# Patient Record
(demographics unavailable — no encounter records)

---

## 2024-11-04 NOTE — HISTORY OF PRESENT ILLNESS
[FreeTextEntry1] : wellness exam [de-identified] : MS. CALL IS A PLEASANT 66 YO PRESENTING FOR YEARLY WELLNESS EXAM.    CARDIOLOGY: DR. TYLER OPHTHALMOLOGY: [facility] EYE CARE GYN: DR. CARMONA DERMATOLOGY: DR. TORIBIO GI: DR. MELGOZA

## 2024-11-04 NOTE — HEALTH RISK ASSESSMENT
[Excellent] : ~his/her~  mood as  excellent [No] : In the past 12 months have you used drugs other than those required for medical reasons? No [No falls in past year] : Patient reported no falls in the past year [Little interest or pleasure doing things] : 1) Little interest or pleasure doing things [Feeling down, depressed, or hopeless] : 2) Feeling down, depressed, or hopeless [0] : 2) Feeling down, depressed, or hopeless: Not at all (0) [PHQ-2 Negative - No further assessment needed] : PHQ-2 Negative - No further assessment needed [Never] : Never [NO] : No [HIV test declined] : HIV test declined [Hepatitis C test declined] : Hepatitis C test declined [None] : None [Alone] : lives alone [Retired] : retired [High School] : high school [] :  [# Of Children ___] : has [unfilled] children [Feels Safe at Home] : Feels safe at home [Fully functional (bathing, dressing, toileting, transferring, walking, feeding)] : Fully functional (bathing, dressing, toileting, transferring, walking, feeding) [Fully functional (using the telephone, shopping, preparing meals, housekeeping, doing laundry, using] : Fully functional and needs no help or supervision to perform IADLs (using the telephone, shopping, preparing meals, housekeeping, doing laundry, using transportation, managing medications and managing finances) [Reports normal functional visual acuity (ie: able to read med bottle)] : Reports normal functional visual acuity [Smoke Detector] : smoke detector [Carbon Monoxide Detector] : carbon monoxide detector [Safety elements used in home] : safety elements used in home [Seat Belt] :  uses seat belt [Sunscreen] : uses sunscreen [With Patient/Caregiver] : , with patient/caregiver [de-identified] : SUTURE TO FINGER AFTER CUT FINGER 7/24 [de-identified] : STRETCHING, YOGA, WALKING [de-identified] : OVERALL GOOD [[id number]] : 0 [Change in mental status noted] : No change in mental status noted [Language] : denies difficulty with language [Learning/Retaining New Information] : denies difficulty learning/retaining new information [Handling Complex Tasks] : denies difficulty handling complex tasks [Sexually Active] : not sexually active [High Risk Behavior] : no high risk behavior [Reports changes in hearing] : Reports no changes in hearing [Reports changes in vision] : Reports no changes in vision [Reports changes in dental health] : Reports no changes in dental health [Travel to Developing Areas] : does not  travel to developing areas [TB Exposure] : is not being exposed to tuberculosis [Caregiver Concerns] : does not have caregiver concerns [MammogramDate] : 06/24 [MammogramComments] : [facility] [PapSmearDate] : 06/24 [BoneDensityDate] : 03/24 [ColonoscopyDate] : 09/22 [de-identified] : GLASSES FOR DISTANCE AND READING [de-identified] : APPOINTMENT WITH DENTIST NEXT WEEK [AdvancecareDate] : 11/24

## 2024-11-04 NOTE — PHYSICAL EXAM
[No Acute Distress] : no acute distress [Well Nourished] : well nourished [Well-Appearing] : well-appearing [Normal Sclera/Conjunctiva] : normal sclera/conjunctiva [PERRL] : pupils equal round and reactive to light [Normal Outer Ear/Nose] : the outer ears and nose were normal in appearance [Normal Oropharynx] : the oropharynx was normal [Normal TMs] : both tympanic membranes were normal [Supple] : supple [No Respiratory Distress] : no respiratory distress  [No Accessory Muscle Use] : no accessory muscle use [Clear to Auscultation] : lungs were clear to auscultation bilaterally [Normal Rate] : normal rate  [Regular Rhythm] : with a regular rhythm [Normal S1, S2] : normal S1 and S2 [Soft] : abdomen soft [Non Tender] : non-tender [Normal Bowel Sounds] : normal bowel sounds [No Joint Swelling] : no joint swelling [Grossly Normal Strength/Tone] : grossly normal strength/tone [Coordination Grossly Intact] : coordination grossly intact [No Focal Deficits] : no focal deficits [Normal Gait] : normal gait [Deep Tendon Reflexes (DTR)] : deep tendon reflexes were 2+ and symmetric [Speech Grossly Normal] : speech grossly normal [Normal Affect] : the affect was normal [Normal Mood] : the mood was normal

## 2024-11-04 NOTE — PLAN
[FreeTextEntry1] : VISION SCREENING SAFETY / HEALTHY HABITS REVIEWED HEALTHY DIET AND LIFESTYLE MODIFICATIONS VACCINATIONS DISCUSSED: COVID, RSV CHECK ROUTINE LAB WORK FOLLOW-UP ALL SPECIALISTS AS DIRECTED NEED MAMMOGRAM RESULTS FROM [facility] CALL WITH ANY QUESTIONS, CONCERNS OR CHANGES

## 2024-12-23 NOTE — ASSESSMENT
[FreeTextEntry1] : ASSESSMENT: The patient comes in today with chronic exacerbated left wrist and thumb discomfort.  She describes difficulty with activities that involve pinch and .  Symptoms are bothersome and are affecting her ADLs.  Symptoms today are consistent with left basal joint arthropathy as well as left de Quervain's tenosynovitis.  Treatment modalities were discussed, the patient elects for injections.  We have also discussed activity modifications and bracing for her condition.   The patient was adequately and thoroughly informed of my assessment of their current condition(s).  - This may diminish bodily function for the extremity.  We discussed prognosis, treatment modalities including operative and nonoperative options for the above diagnostic assessment. As always, 2nd opinion is always provided as an option. For this, when accessible, I was able to review other physicians note(s) including reviewing other tests, imaging results as well as personally view these results for my own interpretation.      Injection:   The risks and benefits of a steroid injection were discussed in detail. The risks include but are not limited to: pain, infection, swelling, flare response, bleeding, subcutaneous fat atrophy, skin depigmentation and/or elevation of blood sugar. The risk of incomplete resolution of symptoms, recurrence and additional intervention was reviewed and considered by the patient.  The patient agreed to proceed and under a sterile prep, I injected 1 unit (6mg) into 1 cc of a combination of Celestone and Lidocaine into the [left basal joint, left de Quervain's]. The patient tolerated the injection well.   The patient was adequately and thoroughly informed of my assessment of their current condition(s).   DISCUSSION: 1.  Injections as above.  Activity modifications.  Bracing provided. 2.  She recently returned from [place] visiting her daughters job.  She is heading to [place] this week for the holidays. 3. [x]

## 2024-12-23 NOTE — HISTORY OF PRESENT ILLNESS
[FreeTextEntry1] : Jayde is a very pleasant 67-year-old female who presents today with chronic exacerbated left wrist and thumb discomfort.  She describes difficulty with activities that involve pinch and .  Symptoms are bothersome and are affecting her ADLs.

## 2024-12-23 NOTE — PHYSICAL EXAM
[de-identified] : Examination at the level of the [left] wrist reveals tenderness at the level of the first dorsal compartment with a positive finkelstein.  Examination of the [left] thumb basal joint reveals pain with compression of the CMC joint with a positive grind test for pain. Adjacent thumb MCP joint is stable without hyperextension [de-identified] : [4] views of [bilateral hands and wrists] were obtained today in my office and were seen by me and discussed with the patient.  These [show findings consistent with bilateral basal joint OA and findings of IP joint OA]

## 2025-01-03 NOTE — HISTORY OF PRESENT ILLNESS
[No Pertinent Pulmonary History] : no history of asthma, COPD, sleep apnea, or smoking [No Adverse Anesthesia Reaction] : no adverse anesthesia reaction in self or family member [(Patient denies any chest pain, claudication, dyspnea on exertion, orthopnea, palpitations or syncope)] : Patient denies any chest pain, claudication, dyspnea on exertion, orthopnea, palpitations or syncope [Aortic Stenosis] : no aortic stenosis [Atrial Fibrillation] : no atrial fibrillation [Coronary Artery Disease] : no coronary artery disease [Recent Myocardial Infarction] : no recent myocardial infarction [Implantable Device/Pacemaker] : no implantable device/pacemaker [Chronic Anticoagulation] : no chronic anticoagulation [Chronic Kidney Disease] : no chronic kidney disease [Diabetes] : no diabetes [FreeTextEntry1] : UPPER EYELID BLEPHAROPLASTY UPPER EYELIDS [FreeTextEntry2] : JANUARY 8, 2025 [FreeTextEntry3] : DR. RUANO [FreeTextEntry4] : MS. CALL IS A PLEASANT 66 YO PRESENTING FOR MEDICAL CLEARANCE FOR UPCOMING EYELID SURGERY.  FEELING WELL TODAY.  NO HISTORY OF MI, STROKE OR SEIZURE.  NO PERSONAL OR FAMILY HISTORY OF BLOOD OR CLOTTING DISORDERS.  DENIES EASY BLEEDING OR BRUISING.  IS ABLE TO WALK MULTIPLE BLOCKS AND GO UP MULTIPLE FLIGHTS OF STAIRS WITHOUT DIFFICULTY.   YOGA AND WALKING, HIKING.  EXERCISES DAILY.  HAS HAD NO HEADACHE, DIZZINESS, CHEST PAIN, SHORTNESS OF BREATH, GI OR URINARY COMPLAINTS.  NO OTHER COMPLAINTS TODAY. CHRONIC HISTORY OF HYPERLIPIDEMIA AND HYPOTHYROIDISM.  ON REPATHA DUE TO INABILITY TO TOLERATE PO STATIN THERAPY.  ALSO HISTORY OF LOW VITAMIN D AND B12.  FEELING WELL TODAY. [FreeTextEntry7] : CARDIOLOGY DR. TYLER JANUARY 2024 ECHOCARDIOGRAM 1/24 HOLTER 9/20 STRESS TEST 9/20 ECHOCARDIOGRAM 9/20 [facility] MRI 2020

## 2025-01-03 NOTE — HISTORY OF PRESENT ILLNESS
[No Pertinent Pulmonary History] : no history of asthma, COPD, sleep apnea, or smoking [No Adverse Anesthesia Reaction] : no adverse anesthesia reaction in self or family member [(Patient denies any chest pain, claudication, dyspnea on exertion, orthopnea, palpitations or syncope)] : Patient denies any chest pain, claudication, dyspnea on exertion, orthopnea, palpitations or syncope [Aortic Stenosis] : no aortic stenosis [Atrial Fibrillation] : no atrial fibrillation [Coronary Artery Disease] : no coronary artery disease [Recent Myocardial Infarction] : no recent myocardial infarction [Implantable Device/Pacemaker] : no implantable device/pacemaker [Chronic Anticoagulation] : no chronic anticoagulation [Chronic Kidney Disease] : no chronic kidney disease [Diabetes] : no diabetes [FreeTextEntry1] : UPPER EYELID BLEPHAROPLASTY UPPER EYELIDS [FreeTextEntry2] : JANUARY 8, 2025 [FreeTextEntry3] : DR. RUANO [FreeTextEntry4] : MS. CALL IS A PLEASANT 68 YO PRESENTING FOR MEDICAL CLEARANCE FOR UPCOMING EYELID SURGERY.  FEELING WELL TODAY.  NO HISTORY OF MI, STROKE OR SEIZURE.  NO PERSONAL OR FAMILY HISTORY OF BLOOD OR CLOTTING DISORDERS.  DENIES EASY BLEEDING OR BRUISING.  IS ABLE TO WALK MULTIPLE BLOCKS AND GO UP MULTIPLE FLIGHTS OF STAIRS WITHOUT DIFFICULTY.   YOGA AND WALKING, HIKING.  EXERCISES DAILY.  HAS HAD NO HEADACHE, DIZZINESS, CHEST PAIN, SHORTNESS OF BREATH, GI OR URINARY COMPLAINTS.  NO OTHER COMPLAINTS TODAY. CHRONIC HISTORY OF HYPERLIPIDEMIA AND HYPOTHYROIDISM.  ON REPATHA DUE TO INABILITY TO TOLERATE PO STATIN THERAPY.  ALSO HISTORY OF LOW VITAMIN D AND B12.  FEELING WELL TODAY. [FreeTextEntry7] : CARDIOLOGY DR. TYLER JANUARY 2024 ECHOCARDIOGRAM 1/24 HOLTER 9/20 STRESS TEST 9/20 ECHOCARDIOGRAM 9/20 [facility] MRI 2020

## 2025-01-03 NOTE — PLAN
[FreeTextEntry1] : OPTIMIZED MEDICALLY FOR PROPOSED SURGICAL PROCEDURE PENDING PRE-OPERATIVE TESTING REVIEW EKG: NSR AT 73 BPM, NONSPECIFIC T CHRONIC COMPARED TO PRIOR EKG CARDIOLOGY CONSULTANT NOTE FROM 1/11/24 REVIEWED GOOD EXERCISE CAPACITY GI/DVT PROPHYLAXIS PER SURGERY AVOIDANCE OF NSAIDS, VITAMINS AND ASPIRIN CONTAINING PRODUCTS PRIOR TO SURGERY CALL WITH ANY QUESTIONS, CONCERNS OR CHANGES PRIOR TO PROCEDURE SUPPORT PROVIDED FOLLOW-UP POST-OPERATIVELY  ADDITIONAL TIME SPENT IN CHART REVIEW, NOTE COMPLTION AND COUNSELING  addendum 1/3/25: pre-operative lab work reviewed; optimized medically for proposed low risk procedure

## 2025-01-30 NOTE — PHYSICAL EXAM
[General Appearance - Well Developed] : well developed [General Appearance - Well Nourished] : well nourished [Normal Conjunctiva] : the conjunctiva exhibited no abnormalities [Normal Oropharynx] : normal oropharynx [Normal Jugular Venous V Waves Present] : normal jugular venous V waves present [] : no respiratory distress [Respiration, Rhythm And Depth] : normal respiratory rhythm and effort [Auscultation Breath Sounds / Voice Sounds] : lungs were clear to auscultation bilaterally [Heart Rate And Rhythm] : heart rate and rhythm were normal [Heart Sounds] : normal S1 and S2 [Murmurs] : no murmurs present [Edema] : no peripheral edema present [Abdomen Soft] : soft [Abdomen Tenderness] : non-tender [Abnormal Walk] : normal gait [Nail Clubbing] : no clubbing of the fingernails [Cyanosis, Localized] : no localized cyanosis [Skin Color & Pigmentation] : normal skin color and pigmentation [Oriented To Time, Place, And Person] : oriented to person, place, and time [No Anxiety] : not feeling anxious

## 2025-01-30 NOTE — PHYSICAL EXAM
[General Appearance - Well Developed] : well developed [General Appearance - Well Nourished] : well nourished [Normal Conjunctiva] : the conjunctiva exhibited no abnormalities [Normal Oropharynx] : normal oropharynx [Normal Jugular Venous V Waves Present] : normal jugular venous V waves present [] : no respiratory distress [Respiration, Rhythm And Depth] : normal respiratory rhythm and effort [Heart Rate And Rhythm] : heart rate and rhythm were normal [Auscultation Breath Sounds / Voice Sounds] : lungs were clear to auscultation bilaterally [Heart Sounds] : normal S1 and S2 [Murmurs] : no murmurs present [Edema] : no peripheral edema present [Abdomen Soft] : soft [Abdomen Tenderness] : non-tender [Abnormal Walk] : normal gait [Nail Clubbing] : no clubbing of the fingernails [Cyanosis, Localized] : no localized cyanosis [Skin Color & Pigmentation] : normal skin color and pigmentation [No Anxiety] : not feeling anxious [Oriented To Time, Place, And Person] : oriented to person, place, and time

## 2025-02-03 NOTE — DISCUSSION/SUMMARY
[EKG obtained to assist in diagnosis and management of assessed problem(s)] : EKG obtained to assist in diagnosis and management of assessed problem(s) [FreeTextEntry1] : Patient is a 68yo F with HLD, family h/o premature CAD here for cardiac follow up.   HLD -Cannot tolerate statins, had been on Livalo but not covered by insurance. (tried zetia, zocor, lipitor, crestor and has not tolerated any).  -HAd been on Praluent, changed to Repatha due to insurance 2021. Lipids mildly improved 11/2024 but increased over past year or so -Eats well, she is pescatarian. Could cut back ice cream. Will plan CAC to help risk stratify and can consider adding zetia or Nexletol   MR: -Echo 9/2020 unremarkable, mild MR/TR -Repeat echo 1/2024 with mild-mod MR, surveillance only still     PVCs/NSVT: -PVCs and short run NSVT in past with structurally normal heart, Cardiac MRI unremarkable in 2020 -Still with frequent ectopy on ECG without symptoms, echo with normal LV function in 2024 -Stress test 9/2020 without ischemia.   1. CV stable at this time 2. Recommend aggressive diet and lifestyle modifications, continues to exercise regularly  3. Regular PMD follow up 4. Check CAC score, call with results and determine if will add additional lipid lowering agent 5. Follow up 6months

## 2025-02-03 NOTE — HISTORY OF PRESENT ILLNESS
[FreeTextEntry1] : Patient is a 66yo F with HLD, family h/o CAD here for cardiac follow up.  Patient has been doing well without any chest pain or shortness of breath. Patient denies PND/orthopnea/edema/palpitations/syncope/claudication. Feels good doing walking and yard work/garden work. Does Yoga frequently still and feels well, a bit less active lately but looking to start again.      Lives with daughter who is in grad school. She is . Not working, helped raise kids and grandkids. Travels  frequently as well  Father passed in 2024  ROS: GI and  negative

## 2025-02-03 NOTE — ASSESSMENT
[FreeTextEntry1] : ECG: SR, frequent unifocal PVCs, NSST    HDL 92  TG 98 (11/2024)   HDL 80  TG 83 (5/2024)   HDL 94  TG 85 (8/2023)   HDL 85   (6/2022)  HDL 81    HDL 96  TG 92 (4/2021)   CARDIAC MRI 2020 1. Normal study 2. EF 62%  ECHO 1/2024: 1. Normal LV size, systolic and diastolic function. EF 60-65% 2. Mild to moderate MR   ECHO 9/2020: 1. Normal LV size, systolic and diastolic function. EF 55-60% 2. Normal RV/LA/RA 3. Mild MR/TR  EX NUCLEAR STRESS TEST 9/2020: 1. Negative for ischemia/infarct 2. EF 55% 3. Frequent PVCs and 10 beats NSVT, ventricular bigeminy and couplets  2 Week MCOT 2020: SR, rare ectopy   HOLTER 9/2020: 1. SR 2. 21 multifocal PVCs  .

## 2025-02-03 NOTE — DISCUSSION/SUMMARY
[EKG obtained to assist in diagnosis and management of assessed problem(s)] : EKG obtained to assist in diagnosis and management of assessed problem(s) [FreeTextEntry1] : Patient is a 66yo F with HLD, family h/o premature CAD here for cardiac follow up.   HLD -Cannot tolerate statins, had been on Livalo but not covered by insurance. (tried zetia, zocor, lipitor, crestor and has not tolerated any).  -HAd been on Praluent, changed to Repatha due to insurance 2021. Lipids mildly improved 11/2024 but increased over past year or so -Eats well, she is pescatarian. Could cut back ice cream. Will plan CAC to help risk stratify and can consider adding zetia or Nexletol   MR: -Echo 9/2020 unremarkable, mild MR/TR -Repeat echo 1/2024 with mild-mod MR, surveillance only still     PVCs/NSVT: -PVCs and short run NSVT in past with structurally normal heart, Cardiac MRI unremarkable in 2020 -Still with frequent ectopy on ECG without symptoms, echo with normal LV function in 2024 -Stress test 9/2020 without ischemia.   1. CV stable at this time 2. Recommend aggressive diet and lifestyle modifications, continues to exercise regularly  3. Regular PMD follow up 4. Check CAC score, call with results and determine if will add additional lipid lowering agent 5. Follow up 6months

## 2025-03-25 NOTE — HISTORY OF PRESENT ILLNESS
[FreeTextEntry1] : Jayde is a very pleasant 67-year-old female presenting today with a history of left wrist and thumb discomfort difficulty with  and pinch.  Bracing has been very helpful as well as prior injections however with recurrence.

## 2025-03-25 NOTE — PHYSICAL EXAM
[de-identified] : Examination at the level of the [left] wrist reveals tenderness at the level of the first dorsal compartment with a positive finkelstein. Examination of the left thumb basal joint reveals pain with compression of the CMC joint with a positive grind test for pain.  [Adjacent thumb MCP joint is stable without hyperextension]

## 2025-03-25 NOTE — ASSESSMENT
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  With this in mind we have discussed treatment options bracing activity modification today she elects for injections as they have been helpful   The patient was adequately and thoroughly informed of my assessment of their current condition(s).  - This may diminish bodily function for the extremity. We discussed prognosis, tx modalities including operative and nonoperative options for the above diagnostic assessment. As always, 2nd opinion is always provided as an option.  When accessible, I was able to review other physicians note(s) including reviewing other tests, imaging results as well as personally view these results for my own interpretation.   Injection:   The risks and benefits of a steroid injection were discussed in detail. The risks include but are not limited to: pain, infection, swelling, flare response, bleeding, subcutaneous fat atrophy, skin depigmentation and/or elevation of blood sugar. The risk of incomplete resolution of symptoms, recurrence and additional intervention was reviewed and considered by the patient. The patient agreed to proceed and under a sterile prep, I injected 1 unit 6mg into 1 cc of a combination of Celestone and Lidocaine into the left basal joint, left first dorsal compartment. The patient tolerated the injection well.  The patient was adequately and thoroughly informed of my assessment of their current condition(s).  DISCUSSION: 1.  Injections as above.  Bracing activity modification.  Follow-up 3 months 2. [x] 3. [x]

## 2025-06-25 NOTE — PHYSICAL EXAM
[de-identified] : Examination at the level of the [left] wrist reveals tenderness at the level of the first dorsal compartment with a positive finkelstein. Examination of the left thumb basal joint reveals pain with compression of the CMC joint with a positive grind test for pain.  [Adjacent thumb MCP joint is stable without hyperextension] Examination of the hand(s)  particularly at the A1 of the left thumb reveals tenderness with a palpable click.

## 2025-06-25 NOTE — ASSESSMENT
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  With this in mind we have discussed treatment options bracing activity modification today she elects for injections as they have been helpful.  We have discussed basal joint surgery from arthroplasty to denervation.  She is more interested in minimally invasive procedures understanding benefits and limitations   The patient was adequately and thoroughly informed of my assessment of their current condition(s).  - This may diminish bodily function for the extremity. We discussed prognosis, tx modalities including operative and nonoperative options for the above diagnostic assessment. As always, 2nd opinion is always provided as an option.  When accessible, I was able to review other physicians note(s) including reviewing other tests, imaging results as well as personally view these results for my own interpretation.   *** Injection procedure for left thumb trigger tendon sheath: The risks and benefits of a steroid injection were discussed in detail. The risks include but are not limited to: pain, infection, swelling, flare response, bleeding, subcutaneous fat atrophy, skin depigmentation and/or elevation of blood sugar. The risk of incomplete resolution of symptoms, recurrence and additional intervention was reviewed and considered by the patient. The patient agreed to proceed and under a sterile prep, I injected 1 unit 6mg into 1 cc of a combination of Celestone and Lidocaine into the above stated location for the procedure. The patient tolerated the injection well.   *** Injection procedure for left first dorsal tendon sheath: The risks and benefits of a steroid injection were discussed in detail. The risks include but are not limited to: pain, infection, swelling, flare response, bleeding, subcutaneous fat atrophy, skin depigmentation and/or elevation of blood sugar. The risk of incomplete resolution of symptoms, recurrence and additional intervention was reviewed and considered by the patient. The patient agreed to proceed and under a sterile prep, I injected 1 unit 6mg into 1 cc of a combination of Celestone and Lidocaine into the above stated location for the procedure. The patient tolerated the injection well.   *** Injection procedure for left first CMC medium joint: The risks and benefits of a steroid injection were discussed in detail. The risks include but are not limited to: pain, infection, swelling, flare response, bleeding, subcutaneous fat atrophy, skin depigmentation and/or elevation of blood sugar. The risk of incomplete resolution of symptoms, recurrence and additional intervention was reviewed and considered by the patient. The patient agreed to proceed and under a sterile prep, I injected 1 unit 6mg into 1 cc of a combination of Celestone and Lidocaine into the above stated location for the procedure. The patient tolerated the injection well.     The patient was adequately and thoroughly informed of my assessment of their current condition(s).  DISCUSSION: 1.  Injections as above.  Bracing activity modification.  Follow-up 3 months 2.  We have discussed basal joint surgery.  We have discussed denervation surgery and tendon releases 3. [x]

## 2025-06-25 NOTE — HISTORY OF PRESENT ILLNESS
[de-identified] : ran out of vitamin d 3 months ago feeling well on levothyroxine.  no missed doses.  not tired.  no changes in hair or skin. upcoming gyn appointment; is being sent for mammogram

## 2025-07-28 NOTE — DISCUSSION/SUMMARY
[EKG obtained to assist in diagnosis and management of assessed problem(s)] : EKG obtained to assist in diagnosis and management of assessed problem(s) [FreeTextEntry1] : Patient is a 69yo F with HLD, family h/o premature CAD here for cardiac follow up.   HLD -Cannot tolerate statins, had been on Livalo but not covered by insurance. (tried zetia, zocor, lipitor, crestor and has not tolerated any).  -HAd been on Praluent, changed to Repatha due to insurance 2021. Lipids mildly improved 11/2024 but increased over past year or so -Eats well, she is pescatarian. Could cut back ice cream. Had planned CAC to help risk stratify and can consider adding zetia or Nexletol , never had CAC done   MR: -Echo 9/2020 unremarkable, mild MR/TR -Repeat echo 1/2024 with mild-mod MR, surveillance only still     PVCs/NSVT: -PVCs and short run NSVT in past with structurally normal heart, Cardiac MRI unremarkable in 2020 -Still with frequent ectopy on ECG without symptoms, echo with normal LV function in 2024 -Stress test 9/2020 without ischemia.   1. CV stable at this time 2. Recommend aggressive diet and lifestyle modifications, continues to exercise regularly  3. Regular PMD follow up 4. Check CAC score 5. Trial of new injection site for Repatha and add Nexletol, check lipids again at follow up 3months

## 2025-07-28 NOTE — HISTORY OF PRESENT ILLNESS
[FreeTextEntry1] : Patient is a 69yo F with HLD, family h/o CAD here for cardiac follow up.  Patient has been doing well without any chest pain or shortness of breath. Patient denies PND/orthopnea/edema/palpitations/syncope/claudication. Feels good doing walking and yard work/garden work. Does Yoga frequently still and feels well. A bit sore from playing [employer] with 17yo grandson. Still mows lawn regularly without symptoms.   Lives with daughter who is in grad school. She is . Not working, helped raise kids and grandkids. Travels  frequently as well .Father passed in 2024  ROS: GI and  negative

## 2025-07-28 NOTE — ASSESSMENT
[FreeTextEntry1] : ECG: SR, LAFB    HDL 95  TG 71 (6/2025)   HDL 92  TG 98 (11/2024)   HDL 80  TG 83 (5/2024)   HDL 94  TG 85 (8/2023)   HDL 85   (6/2022)  HDL 81    HDL 96  TG 92 (4/2021)   CARDIAC MRI 2020 1. Normal study 2. EF 62%  ECHO 1/2024: 1. Normal LV size, systolic and diastolic function. EF 60-65% 2. Mild to moderate MR   ECHO 9/2020: 1. Normal LV size, systolic and diastolic function. EF 55-60% 2. Normal RV/LA/RA 3. Mild MR/TR  EX NUCLEAR STRESS TEST 9/2020: 1. Negative for ischemia/infarct 2. EF 55% 3. Frequent PVCs and 10 beats NSVT, ventricular bigeminy and couplets  2 Week MCOT 2020: SR, rare ectopy   HOLTER 9/2020: 1. SR 2. 21 multifocal PVCs  .

## 2025-07-28 NOTE — DISCUSSION/SUMMARY
[EKG obtained to assist in diagnosis and management of assessed problem(s)] : EKG obtained to assist in diagnosis and management of assessed problem(s) [FreeTextEntry1] : Patient is a 67yo F with HLD, family h/o premature CAD here for cardiac follow up.   HLD -Cannot tolerate statins, had been on Livalo but not covered by insurance. (tried zetia, zocor, lipitor, crestor and has not tolerated any).  -HAd been on Praluent, changed to Repatha due to insurance 2021. Lipids mildly improved 11/2024 but increased over past year or so -Eats well, she is pescatarian. Could cut back ice cream. Had planned CAC to help risk stratify and can consider adding zetia or Nexletol , never had CAC done   MR: -Echo 9/2020 unremarkable, mild MR/TR -Repeat echo 1/2024 with mild-mod MR, surveillance only still     PVCs/NSVT: -PVCs and short run NSVT in past with structurally normal heart, Cardiac MRI unremarkable in 2020 -Still with frequent ectopy on ECG without symptoms, echo with normal LV function in 2024 -Stress test 9/2020 without ischemia.   1. CV stable at this time 2. Recommend aggressive diet and lifestyle modifications, continues to exercise regularly  3. Regular PMD follow up 4. Check CAC score 5. Trial of new injection site for Repatha and add Nexletol, check lipids again at follow up 3months

## 2025-07-28 NOTE — HISTORY OF PRESENT ILLNESS
[FreeTextEntry1] : Patient is a 67yo F with HLD, family h/o CAD here for cardiac follow up.  Patient has been doing well without any chest pain or shortness of breath. Patient denies PND/orthopnea/edema/palpitations/syncope/claudication. Feels good doing walking and yard work/garden work. Does Yoga frequently still and feels well. A bit sore from playing [employer] with 15yo grandson. Still mows lawn regularly without symptoms.   Lives with daughter who is in grad school. She is . Not working, helped raise kids and grandkids. Travels  frequently as well .Father passed in 2024  ROS: GI and  negative